# Patient Record
Sex: FEMALE | Race: BLACK OR AFRICAN AMERICAN | Employment: OTHER | URBAN - NONMETROPOLITAN AREA
[De-identification: names, ages, dates, MRNs, and addresses within clinical notes are randomized per-mention and may not be internally consistent; named-entity substitution may affect disease eponyms.]

---

## 2019-06-26 ENCOUNTER — HOSPITAL ENCOUNTER (EMERGENCY)
Age: 64
Discharge: HOME | End: 2019-06-26
Attending: EMERGENCY MEDICINE
Payer: MEDICARE

## 2019-06-26 VITALS
TEMPERATURE: 99.2 F | OXYGEN SATURATION: 97 % | BODY MASS INDEX: 45.78 KG/M2 | RESPIRATION RATE: 18 BRPM | HEIGHT: 62 IN | SYSTOLIC BLOOD PRESSURE: 135 MMHG | WEIGHT: 248.8 LBS | HEART RATE: 90 BPM | DIASTOLIC BLOOD PRESSURE: 72 MMHG

## 2019-06-26 DIAGNOSIS — H81.11 BPV (BENIGN POSITIONAL VERTIGO), RIGHT: Primary | ICD-10-CM

## 2019-06-26 DIAGNOSIS — M25.422 EFFUSION OF LEFT OLECRANON BURSA: ICD-10-CM

## 2019-06-26 PROCEDURE — 99283 EMERGENCY DEPT VISIT LOW MDM: CPT

## 2019-06-26 RX ORDER — SPIRONOLACTONE 25 MG/1
25 TABLET ORAL DAILY
COMMUNITY
Start: 2018-07-26

## 2019-06-26 RX ORDER — FUROSEMIDE 40 MG/1
40 TABLET ORAL DAILY
COMMUNITY
Start: 2018-02-01

## 2019-06-26 RX ORDER — EZETIMIBE 10 MG/1
10 TABLET ORAL DAILY
COMMUNITY
Start: 2018-07-26

## 2019-06-26 RX ORDER — MECLIZINE HYDROCHLORIDE 25 MG/1
12.5 TABLET ORAL 3 TIMES DAILY PRN
Qty: 30 TABLET | Refills: 0 | Status: SHIPPED | OUTPATIENT
Start: 2019-06-26 | End: 2019-07-06

## 2019-06-26 RX ORDER — COLCHICINE 0.6 MG/1
0.6 TABLET ORAL PRN
COMMUNITY
Start: 2018-07-26

## 2019-06-26 RX ORDER — FLUTICASONE PROPIONATE 50 MCG
1 SPRAY, SUSPENSION (ML) NASAL PRN
COMMUNITY
Start: 2018-07-26

## 2019-06-26 RX ORDER — ASPIRIN 81 MG/1
81 TABLET ORAL DAILY
COMMUNITY
Start: 2018-07-26

## 2019-06-26 RX ORDER — CLONAZEPAM 1 MG/1
1 TABLET ORAL PRN
COMMUNITY
Start: 2019-05-29 | End: 2019-06-28

## 2019-06-26 RX ORDER — MAGNESIUM OXIDE 400 MG/1
400 TABLET ORAL 2 TIMES DAILY
COMMUNITY
Start: 2018-07-26

## 2019-06-26 RX ORDER — CARVEDILOL 25 MG/1
25 TABLET ORAL 2 TIMES DAILY
COMMUNITY
Start: 2019-01-11

## 2019-06-26 ASSESSMENT — PAIN DESCRIPTION - PAIN TYPE: TYPE: ACUTE PAIN

## 2019-06-26 ASSESSMENT — PAIN DESCRIPTION - DESCRIPTORS: DESCRIPTORS: ACHING

## 2019-06-26 ASSESSMENT — PAIN DESCRIPTION - FREQUENCY: FREQUENCY: INTERMITTENT

## 2019-06-26 ASSESSMENT — PAIN SCALES - GENERAL: PAINLEVEL_OUTOF10: 3

## 2019-06-26 ASSESSMENT — PAIN DESCRIPTION - LOCATION: LOCATION: ELBOW

## 2019-06-26 ASSESSMENT — PAIN DESCRIPTION - ORIENTATION: ORIENTATION: LEFT

## 2019-06-26 NOTE — ED PROVIDER NOTES
eMERGENCY dEPARTMENT eNCOUnter      CHIEF COMPLAINT    No chief complaint on file. JOY Goodrich is a 62 y.o. female who presentsto ED from home  By private car  With complaint of right ear fullness, dizziness, left elbow swelling  Onset couple days  Intensity of symptoms moderate  Patient is visiting from [place] and feels that her right ear is full and clogged. She also has got intermittent dizziness which is vertiginous. Patient denies any other neurologic symptoms. Patient has got left elbow swelling which comes and goes. Patient denies any fever chills. Patient is a diabetic. PAST MEDICAL HISTORY    Past Medical History:   Diagnosis Date    CHF (congestive heart failure) ([address]     Diabetes mellitus [facility]     Gout     Hypertension     Pacemaker     Sleep apnea        SURGICAL HISTORY    Past Surgical History:   Procedure Laterality Date    PACEMAKER INSERTION Left 2012       CURRENT MEDICATIONS    Current Outpatient Rx   Medication Sig Dispense Refill    meclizine (ANTIVERT) 25 MG tablet Take 0.5 tablets by mouth 3 times daily as needed for Dizziness 30 tablet 0       ALLERGIES    Allergies   Allergen Reactions    Atorvastatin Itching    Codeine Itching    Hydralazine Rash       FAMILY HISTORY    No family history on file.     SOCIAL HISTORY    Social History     Socioeconomic History    Marital status: Unknown     Spouse name: Not on file    Number of children: Not on file    Years of education: Not on file    Highest education level: Not on file   Occupational History    Not on file   Social Needs    Financial resource strain: Not on file    Food insecurity:     Worry: Not on file     Inability: Not on file    Transportation needs:     Medical: Not on file     Non-medical: Not on file   Tobacco Use    Smoking status: Not on file   Substance and Sexual Activity    Alcohol use: Not on file    Drug use: Not on file    Sexual activity: Not on file   Lifestyle    Physical activity:     Days per week: Not on file     Minutes per session: Not on file    Stress: Not on file   Relationships    Social connections:     Talks on phone: Not on file     Gets together: Not on file     Attends Restoration service: Not on file     Active member of club or organization: Not on file     Attends meetings of clubs or organizations: Not on file     Relationship status: Not on file    Intimate partner violence:     Fear of current or ex partner: Not on file     Emotionally abused: Not on file     Physically abused: Not on file     Forced sexual activity: Not on file   Other Topics Concern    Not on file   Social History Narrative    Not on file       REVIEW OF SYSTEMS    Constitutional:  Denies fever, chills, weight loss or weakness   Eyes:  Denies photophobia or discharge   HENT:  Denies sore throat or ear pain   Respiratory:  Denies cough or shortness of breath   Cardiovascular:  Denies chest pain, palpitations or swelling   GI:  Denies abdominal pain, nausea, vomiting, or diarrhea   Musculoskeletal:  Denies back pain   Skin:  Denies rash   Neurologic:  Denies headache, focal weakness or sensory changes   Endocrine:  Denies polyuria or polydypsia   Lymphatic:  Denies swollen glands   Psychiatric:  Denies depression, suicidal ideation or homicidal ideation   All systems negative except as marked. PHYSICAL EXAM    VITAL SIGNS: Pulse 90   Temp 99.2 °F (37.3 °C) (Oral)   Resp 18   Ht 5' 2\" (1.575 m)   Wt 248 lb 12.8 oz (112.9 kg)   SpO2 97%   BMI 45.51 kg/m²    Constitutional:  Well developed, Well nourished, No acute distress, Non-toxic appearance. HENT:  Normocephalic, Atraumatic, Bilateral external ears normal, Oropharynx moist, No oral exudates, Nose normal. Neck- Normal range of motion, No tenderness, Supple, No stridor. Eyes:  PERRL, EOMI, Conjunctiva normal, No discharge. Respiratory:  Normal breath sounds, No respiratory distress, No wheezing, No chest tenderness. Cardiovascular:  Normal heart rate, Normal rhythm, No murmurs, No rubs, No gallops. GI:  Bowel sounds normal, Soft, No tenderness, No masses, No pulsatile masses. : No CVA tenderness. Musculoskeletal: Left elbow olecranon bursa swelling present no evidence of cellulitis or abscess. Intact distal pulses, No edema, No tenderness, No cyanosis, No clubbing. Good range of motion in all major joints. No tenderness to palpation or major deformities noted. Back- No tenderness. Integument:  Warm, Dry, No erythema, No rash. Lymphatic:  No lymphadenopathy noted. Neurologic:  Alert & oriented x 3, Normal motor function, Normal sensory function, No focal deficits noted. Cerebellar signs absent  Psychiatric:  Affect normal, Judgment normal, Mood normal.         REEVALUATION   Patient ambulating. Summation      Patient Course:     ED Medications administered this visit:  Medications - No data to display    New Prescriptions from this visit:    New Prescriptions    MECLIZINE (ANTIVERT) 25 MG TABLET    Take 0.5 tablets by mouth 3 times daily as needed for Dizziness       Follow-up:  Your PCP    Call in 1 day          Final Impression:   1. BPV (benign positional vertigo), right    2.  Effusion of left olecranon bursa               (Please note that portions of this note were completed with a voice recognition program.  Efforts were made to edit the dictations but occasionally words are mis-transcribed.)            Anna Ruvalcaba MD  06/26/19 9153

## 2019-09-18 ENCOUNTER — HOSPITAL ENCOUNTER (EMERGENCY)
Age: 64
Discharge: HOME | End: 2019-09-18
Attending: EMERGENCY MEDICINE
Payer: MEDICARE

## 2019-09-18 ENCOUNTER — APPOINTMENT (OUTPATIENT)
Dept: GENERAL RADIOLOGY | Age: 64
End: 2019-09-18
Payer: MEDICARE

## 2019-09-18 VITALS
SYSTOLIC BLOOD PRESSURE: 175 MMHG | DIASTOLIC BLOOD PRESSURE: 80 MMHG | RESPIRATION RATE: 12 BRPM | WEIGHT: 245.3 LBS | HEART RATE: 92 BPM | OXYGEN SATURATION: 99 % | HEIGHT: 62 IN | TEMPERATURE: 98.2 F | BODY MASS INDEX: 45.14 KG/M2

## 2019-09-18 DIAGNOSIS — K29.00 ACUTE SUPERFICIAL GASTRITIS WITHOUT HEMORRHAGE: ICD-10-CM

## 2019-09-18 DIAGNOSIS — R42 DIZZINESS: Primary | ICD-10-CM

## 2019-09-18 LAB
-: ABNORMAL
ABSOLUTE EOS #: 0.2 K/UL (ref 0–0.4)
ABSOLUTE IMMATURE GRANULOCYTE: ABNORMAL K/UL (ref 0–0.3)
ABSOLUTE LYMPH #: 1.9 K/UL (ref 1–4.8)
ABSOLUTE MONO #: 0.7 K/UL (ref 0–1)
ALBUMIN SERPL-MCNC: 3.9 G/DL (ref 3.5–5.2)
ALBUMIN/GLOBULIN RATIO: ABNORMAL (ref 1–2.5)
ALP BLD-CCNC: 86 U/L (ref 35–104)
ALT SERPL-CCNC: 18 U/L (ref 5–33)
AMORPHOUS: ABNORMAL
ANION GAP SERPL CALCULATED.3IONS-SCNC: 12 MMOL/L (ref 9–17)
AST SERPL-CCNC: 16 U/L
BACTERIA: ABNORMAL
BASOPHILS # BLD: 1 % (ref 0–2)
BASOPHILS ABSOLUTE: 0 K/UL (ref 0–0.2)
BILIRUB SERPL-MCNC: 0.17 MG/DL (ref 0.3–1.2)
BILIRUBIN URINE: NEGATIVE
BNP INTERPRETATION: NORMAL
BUN BLDV-MCNC: 23 MG/DL (ref 8–23)
BUN/CREAT BLD: 24 (ref 9–20)
CALCIUM SERPL-MCNC: 9.9 MG/DL (ref 8.6–10.4)
CASTS UA: ABNORMAL /LPF
CHLORIDE BLD-SCNC: 102 MMOL/L (ref 98–107)
CO2: 27 MMOL/L (ref 20–31)
COLOR: YELLOW
COMMENT UA: ABNORMAL
CREAT SERPL-MCNC: 0.95 MG/DL (ref 0.5–0.9)
CRYSTALS, UA: ABNORMAL /HPF
DIFFERENTIAL TYPE: YES
EOSINOPHILS RELATIVE PERCENT: 3 % (ref 0–5)
EPITHELIAL CELLS UA: ABNORMAL /HPF
GFR AFRICAN AMERICAN: >60 ML/MIN
GFR NON-AFRICAN AMERICAN: 59 ML/MIN
GFR SERPL CREATININE-BSD FRML MDRD: ABNORMAL ML/MIN/{1.73_M2}
GFR SERPL CREATININE-BSD FRML MDRD: ABNORMAL ML/MIN/{1.73_M2}
GLUCOSE BLD-MCNC: 183 MG/DL (ref 70–99)
GLUCOSE URINE: NEGATIVE
HCT VFR BLD CALC: 38.4 % (ref 36–46)
HEMOGLOBIN: 12.6 G/DL (ref 12–16)
IMMATURE GRANULOCYTES: ABNORMAL %
KETONES, URINE: NEGATIVE
LEUKOCYTE ESTERASE, URINE: ABNORMAL
LIPASE: 13 U/L (ref 13–60)
LYMPHOCYTES # BLD: 29 % (ref 15–40)
MCH RBC QN AUTO: 30.3 PG (ref 26–34)
MCHC RBC AUTO-ENTMCNC: 32.8 G/DL (ref 31–37)
MCV RBC AUTO: 92.3 FL (ref 80–100)
MONOCYTES # BLD: 10 % (ref 4–8)
MUCUS: ABNORMAL
NITRITE, URINE: NEGATIVE
NRBC AUTOMATED: ABNORMAL PER 100 WBC
OTHER OBSERVATIONS UA: ABNORMAL
PDW BLD-RTO: 13.9 % (ref 12.1–15.2)
PH UA: 5 (ref 5–8)
PLATELET # BLD: 232 K/UL (ref 140–450)
PLATELET ESTIMATE: ABNORMAL
PMV BLD AUTO: ABNORMAL FL (ref 6–12)
POTASSIUM SERPL-SCNC: 4.5 MMOL/L (ref 3.7–5.3)
PRO-BNP: 81 PG/ML
PROTEIN UA: ABNORMAL
RBC # BLD: 4.16 M/UL (ref 4–5.2)
RBC # BLD: ABNORMAL 10*6/UL
RBC UA: ABNORMAL /HPF (ref 0–2)
RENAL EPITHELIAL, UA: ABNORMAL /HPF
SEG NEUTROPHILS: 57 % (ref 47–75)
SEGMENTED NEUTROPHILS ABSOLUTE COUNT: 3.8 K/UL (ref 2.5–7)
SODIUM BLD-SCNC: 141 MMOL/L (ref 135–144)
SPECIFIC GRAVITY UA: 1.01 (ref 1–1.03)
TOTAL PROTEIN: 6.6 G/DL (ref 6.4–8.3)
TRICHOMONAS: ABNORMAL
TROPONIN INTERP: NORMAL
TROPONIN T: <0.03 NG/ML
TROPONIN, HIGH SENSITIVITY: NORMAL NG/L (ref 0–14)
TURBIDITY: CLEAR
URINE HGB: ABNORMAL
UROBILINOGEN, URINE: NORMAL
WBC # BLD: 6.6 K/UL (ref 3.5–11)
WBC # BLD: ABNORMAL 10*3/UL
WBC UA: ABNORMAL /HPF
YEAST: ABNORMAL

## 2019-09-18 PROCEDURE — 6370000000 HC RX 637 (ALT 250 FOR IP): Performed by: EMERGENCY MEDICINE

## 2019-09-18 PROCEDURE — 83880 ASSAY OF NATRIURETIC PEPTIDE: CPT

## 2019-09-18 PROCEDURE — 99284 EMERGENCY DEPT VISIT MOD MDM: CPT

## 2019-09-18 PROCEDURE — 71045 X-RAY EXAM CHEST 1 VIEW: CPT

## 2019-09-18 PROCEDURE — 85025 COMPLETE CBC W/AUTO DIFF WBC: CPT

## 2019-09-18 PROCEDURE — 93005 ELECTROCARDIOGRAM TRACING: CPT | Performed by: EMERGENCY MEDICINE

## 2019-09-18 PROCEDURE — 84484 ASSAY OF TROPONIN QUANT: CPT

## 2019-09-18 PROCEDURE — 36415 COLL VENOUS BLD VENIPUNCTURE: CPT

## 2019-09-18 PROCEDURE — 81001 URINALYSIS AUTO W/SCOPE: CPT

## 2019-09-18 PROCEDURE — 80053 COMPREHEN METABOLIC PANEL: CPT

## 2019-09-18 PROCEDURE — 83690 ASSAY OF LIPASE: CPT

## 2019-09-18 RX ORDER — MECLIZINE HYDROCHLORIDE 25 MG/1
25 TABLET ORAL 3 TIMES DAILY PRN
Qty: 15 TABLET | Refills: 0 | Status: SHIPPED | OUTPATIENT
Start: 2019-09-18 | End: 2019-09-28

## 2019-09-18 RX ORDER — CLONAZEPAM 1 MG/1
1 TABLET ORAL 2 TIMES DAILY PRN
COMMUNITY

## 2019-09-18 RX ADMIN — LIDOCAINE HYDROCHLORIDE: 20 SOLUTION ORAL; TOPICAL at 19:47

## 2019-09-20 LAB
EKG ATRIAL RATE: 92 BPM
EKG P AXIS: 70 DEGREES
EKG P-R INTERVAL: 136 MS
EKG Q-T INTERVAL: 372 MS
EKG QRS DURATION: 100 MS
EKG QTC CALCULATION (BAZETT): 460 MS
EKG R AXIS: 37 DEGREES
EKG T AXIS: 56 DEGREES
EKG VENTRICULAR RATE: 92 BPM

## 2019-09-20 PROCEDURE — 93010 ELECTROCARDIOGRAM REPORT: CPT | Performed by: INTERNAL MEDICINE

## 2021-01-03 ENCOUNTER — HOSPITAL ENCOUNTER (EMERGENCY)
Age: 66
Discharge: HOME | End: 2021-01-03
Attending: FAMILY MEDICINE
Payer: MEDICARE

## 2021-01-03 VITALS
BODY MASS INDEX: 45.78 KG/M2 | OXYGEN SATURATION: 98 % | SYSTOLIC BLOOD PRESSURE: 162 MMHG | WEIGHT: 250.3 LBS | HEART RATE: 101 BPM | TEMPERATURE: 99.7 F | RESPIRATION RATE: 16 BRPM | DIASTOLIC BLOOD PRESSURE: 86 MMHG

## 2021-01-03 DIAGNOSIS — M10.041 ACUTE IDIOPATHIC GOUT OF RIGHT HAND: Primary | ICD-10-CM

## 2021-01-03 PROCEDURE — 99283 EMERGENCY DEPT VISIT LOW MDM: CPT

## 2021-01-03 PROCEDURE — 6370000000 HC RX 637 (ALT 250 FOR IP): Performed by: FAMILY MEDICINE

## 2021-01-03 RX ORDER — DULOXETIN HYDROCHLORIDE 30 MG/1
30 CAPSULE, DELAYED RELEASE ORAL DAILY
COMMUNITY

## 2021-01-03 RX ORDER — NAPROXEN 250 MG/1
500 TABLET ORAL ONCE
Status: COMPLETED | OUTPATIENT
Start: 2021-01-03 | End: 2021-01-03

## 2021-01-03 RX ORDER — COLCHICINE 0.6 MG/1
0.6 TABLET ORAL DAILY
Qty: 30 TABLET | Refills: 0 | Status: SHIPPED | OUTPATIENT
Start: 2021-01-03

## 2021-01-03 RX ADMIN — NAPROXEN 500 MG: 250 TABLET ORAL at 06:56

## 2021-01-03 ASSESSMENT — PAIN DESCRIPTION - PAIN TYPE: TYPE: ACUTE PAIN

## 2021-01-03 ASSESSMENT — PAIN DESCRIPTION - LOCATION: LOCATION: HAND

## 2021-01-03 ASSESSMENT — PAIN DESCRIPTION - ORIENTATION: ORIENTATION: RIGHT

## 2021-01-03 ASSESSMENT — PAIN DESCRIPTION - DESCRIPTORS: DESCRIPTORS: CONSTANT;PRESSURE

## 2021-01-03 ASSESSMENT — PAIN SCALES - GENERAL
PAINLEVEL_OUTOF10: 8
PAINLEVEL_OUTOF10: 8

## 2021-01-03 ASSESSMENT — PAIN DESCRIPTION - FREQUENCY: FREQUENCY: CONTINUOUS

## 2021-01-03 NOTE — ED PROVIDER NOTES
 Insulin NPH Isophane & Regular (HUMULIN 70/30 KWIKPEN) (70-30) 100 UNIT per ML injection pen Inject 32 Units into the skin every morning 17 units at bedtime      magnesium oxide (MAG-OX) 400 MG tablet Take 400 mg by mouth 2 times daily      metFORMIN (GLUCOPHAGE) 850 MG tablet Take 850 mg by mouth 2 times daily      sacubitril-valsartan (ENTRESTO)  MG per tablet Take 1 tablet by mouth 2 times daily      spironolactone (ALDACTONE) 25 MG tablet Take 25 mg by mouth daily         ALLERGIES    Allergies   Allergen Reactions    Atorvastatin Itching    Codeine Itching    Hydralazine Rash       FAMILY HISTORY    No family history on file.     SOCIAL HISTORY    Social History     Socioeconomic History    Marital status: Single     Spouse name: Not on file    Number of children: Not on file    Years of education: Not on file    Highest education level: Not on file   Occupational History    Not on file   Social Needs    Financial resource strain: Not on file    Food insecurity     Worry: Not on file     Inability: Not on file    Transportation needs     Medical: Not on file     Non-medical: Not on file   Tobacco Use    Smoking status: Former Smoker    Smokeless tobacco: Never Used   Substance and Sexual Activity    Alcohol use: Not on file    Drug use: Not on file    Sexual activity: Not on file   Lifestyle    Physical activity     Days per week: Not on file     Minutes per session: Not on file    Stress: Not on file   Relationships    Social connections     Talks on phone: Not on file     Gets together: Not on file     Attends Latter-day service: Not on file     Active member of club or organization: Not on file     Attends meetings of clubs or organizations: Not on file     Relationship status: Not on file    Intimate partner violence     Fear of current or ex partner: Not on file     Emotionally abused: Not on file     Physically abused: Not on file     Forced sexual activity: Not on file Other Topics Concern    Not on file   Social History Narrative    Not on file       PHYSICAL EXAM    VITAL SIGNS: BP (!) 162/86   Pulse 101   Temp 99.7 °F (37.6 °C) (Oral)   Resp 16   Wt 250 lb 4.8 oz (113.5 kg)   SpO2 98%   BMI 45.78 kg/m²   Constitutional:  Well developed, well nourished, 61-year-old black female with right hand pain and swelling especially on the dorsal aspect down to the wrist.  No acute distress. HENT:  Atraumatic, external ears normal, nose normal, oropharynx moist.  Neck- normal range of motion, no tenderness, supple. Trachea is midline. No thyroid enlargement or tenderness  Respiratory:  No respiratory distress, normal breath sounds. Cardiovascular:  Normal rate, normal rhythm, no murmurs, no gallops, no rubs   GI:  Soft, obese, nontender   Musculoskeletal: Right hand pain and swelling. Dorsal aspect of the metatarsal area into the wrist is swollen. Palmar aspect not swollen. The fingers have good color and capillary refill with no deformity. Nails are intact. The right elbow and shoulder have no pain or restricted motion. The knees and feet show no gouty involvement. Spine is grossly aligned and nontender. Integument:  Well hydrated, no rash of the upper extremities, neck or face. Neurologic: Alert and oriented female. Her speech is fluent and mentation appropriate with this discussion. She shows controlled and coordinated movements of her extremities. No significant hemiparesis. Her gait is stable. ED COURSE & MEDICAL DECISION MAKING    Pertinent Labs & Imaging studies reviewed. (See chart for details)    Summation      Patient Course: 61-year-old black female with right hand pain and swelling which appear to be secondary to acute gout flareup. No apparent trauma. Treated with naproxen and prescription for colchicine. She has taken this medicine before with good results. Patient is stable for discharge and will follow with PCP in [facility]. ED Medications administered this visit:    Medications   naproxen (NAPROSYN) tablet 500 mg (500 mg Oral Given 1/3/21 0656)       New Prescriptions from this visit:    New Prescriptions    COLCHICINE (COLCRYS) 0.6 MG TABLET    Take 1 tablet by mouth daily       Follow-up:  MD Curt Oliva  [phone]    Call in 2 days  If symptoms worsen        Final Impression:   1.  Acute idiopathic gout of right hand               (Please note that portions of this note were completed with a voice recognition program.  Efforts were made to edit the dictations but occasionally words are mis-transcribed.)          Eleanor Goldman DO  01/05/21 8710

## 2021-01-03 NOTE — ED NOTES
[facility] called to see if we could change the Colchicine to something else d/t the Colchicine medications needs to have a prior authorization. Dr. Tawny Brizuela does not want to change the medication. New order received for Prednisone 40mg by mouth for seven days.       Jenna Neves RN  01/03/21 1557

## 2023-11-06 PROBLEM — R94.30 CARDIAC LV EJECTION FRACTION >40%: Status: ACTIVE | Noted: 2023-11-06

## 2023-11-06 PROBLEM — I10 HYPERTENSION: Status: ACTIVE | Noted: 2023-11-06

## 2023-11-06 PROBLEM — I50.9 CHF (CONGESTIVE HEART FAILURE) (MULTI): Status: ACTIVE | Noted: 2023-11-06

## 2023-11-06 PROBLEM — Z95.810 ICD (IMPLANTABLE CARDIOVERTER-DEFIBRILLATOR) IN PLACE: Status: ACTIVE | Noted: 2023-11-06

## 2023-11-06 PROBLEM — E11.9 DIABETES MELLITUS (MULTI): Status: ACTIVE | Noted: 2023-11-06

## 2023-11-06 PROBLEM — F41.9 ANXIETY: Status: ACTIVE | Noted: 2023-11-06

## 2023-11-06 PROBLEM — E78.5 HYPERLIPIDEMIA: Status: ACTIVE | Noted: 2023-11-06

## 2023-11-06 PROBLEM — I47.20 VENTRICULAR TACHYCARDIA (MULTI): Status: ACTIVE | Noted: 2023-11-06

## 2023-11-06 RX ORDER — CARVEDILOL 25 MG/1
1 TABLET ORAL 2 TIMES DAILY
COMMUNITY
Start: 2018-02-13

## 2023-11-06 RX ORDER — INSULIN HUMAN 100 [IU]/ML
INJECTION, SUSPENSION SUBCUTANEOUS
COMMUNITY
Start: 2018-09-26

## 2023-11-06 RX ORDER — SPIRONOLACTONE 25 MG/1
1 TABLET ORAL DAILY
COMMUNITY
Start: 2018-02-13 | End: 2023-11-07 | Stop reason: ALTCHOICE

## 2023-11-06 RX ORDER — EZETIMIBE 10 MG/1
1 TABLET ORAL DAILY
COMMUNITY
Start: 2018-02-13

## 2023-11-06 RX ORDER — FUROSEMIDE 40 MG/1
0.5 TABLET ORAL DAILY
COMMUNITY
Start: 2018-02-13 | End: 2024-04-17 | Stop reason: SDUPTHER

## 2023-11-06 RX ORDER — CHOLECALCIFEROL (VITAMIN D3) 25 MCG
1 TABLET ORAL DAILY
COMMUNITY
End: 2023-11-07 | Stop reason: ALTCHOICE

## 2023-11-06 RX ORDER — SACUBITRIL AND VALSARTAN 97; 103 MG/1; MG/1
1 TABLET, FILM COATED ORAL 2 TIMES DAILY
COMMUNITY
Start: 2018-02-13 | End: 2023-11-07 | Stop reason: SDUPTHER

## 2023-11-06 RX ORDER — ASPIRIN 81 MG/1
1 TABLET ORAL DAILY
COMMUNITY
Start: 2018-07-27

## 2023-11-06 RX ORDER — CLONAZEPAM 1 MG/1
1 TABLET ORAL 3 TIMES DAILY PRN
COMMUNITY
Start: 2018-02-13

## 2023-11-06 RX ORDER — METFORMIN HYDROCHLORIDE 850 MG/1
TABLET ORAL 2 TIMES DAILY
COMMUNITY
End: 2023-11-07 | Stop reason: ALTCHOICE

## 2023-11-06 RX ORDER — VIT C/E/ZN/COPPR/LUTEIN/ZEAXAN 250MG-90MG
1 CAPSULE ORAL DAILY
COMMUNITY
Start: 2018-09-04

## 2023-11-06 RX ORDER — LIRAGLUTIDE 6 MG/ML
INJECTION SUBCUTANEOUS
COMMUNITY
Start: 2021-06-02

## 2023-11-06 RX ORDER — CALCIUM CARBONATE 300MG(750)
1 TABLET,CHEWABLE ORAL DAILY
COMMUNITY
Start: 2018-02-13

## 2023-11-07 ENCOUNTER — HOSPITAL ENCOUNTER (OUTPATIENT)
Dept: CARDIOLOGY | Facility: CLINIC | Age: 68
Discharge: HOME | End: 2023-11-07
Payer: MEDICAID

## 2023-11-07 ENCOUNTER — APPOINTMENT (OUTPATIENT)
Dept: LAB | Facility: LAB | Age: 68
End: 2023-11-07
Payer: MEDICAID

## 2023-11-07 ENCOUNTER — OFFICE VISIT (OUTPATIENT)
Dept: CARDIOLOGY | Facility: CLINIC | Age: 68
End: 2023-11-07
Payer: MEDICAID

## 2023-11-07 VITALS
HEART RATE: 80 BPM | SYSTOLIC BLOOD PRESSURE: 124 MMHG | WEIGHT: 234.5 LBS | DIASTOLIC BLOOD PRESSURE: 78 MMHG | RESPIRATION RATE: 18 BRPM | BODY MASS INDEX: 43.15 KG/M2 | HEIGHT: 62 IN

## 2023-11-07 DIAGNOSIS — I10 HYPERTENSION, UNSPECIFIED TYPE: ICD-10-CM

## 2023-11-07 DIAGNOSIS — I50.9 CONGESTIVE HEART FAILURE, UNSPECIFIED HF CHRONICITY, UNSPECIFIED HEART FAILURE TYPE (MULTI): ICD-10-CM

## 2023-11-07 DIAGNOSIS — I42.0 CONGESTIVE CARDIOMYOPATHY (MULTI): ICD-10-CM

## 2023-11-07 DIAGNOSIS — I50.9 CHF (CONGESTIVE HEART FAILURE) (MULTI): Primary | ICD-10-CM

## 2023-11-07 DIAGNOSIS — R94.30 CARDIAC LV EJECTION FRACTION >40%: ICD-10-CM

## 2023-11-07 DIAGNOSIS — Z45.02 IMPLANTABLE DEFIBRILLATOR REPROGRAMMING/CHECK: ICD-10-CM

## 2023-11-07 DIAGNOSIS — I48.91 ATRIAL FIBRILLATION, UNSPECIFIED TYPE (MULTI): ICD-10-CM

## 2023-11-07 DIAGNOSIS — E11.69 TYPE 2 DIABETES MELLITUS WITH OTHER SPECIFIED COMPLICATION, UNSPECIFIED WHETHER LONG TERM INSULIN USE (MULTI): ICD-10-CM

## 2023-11-07 PROCEDURE — 99214 OFFICE O/P EST MOD 30 MIN: CPT | Performed by: INTERNAL MEDICINE

## 2023-11-07 PROCEDURE — 93306 TTE W/DOPPLER COMPLETE: CPT

## 2023-11-07 PROCEDURE — 93296 REM INTERROG EVL PM/IDS: CPT

## 2023-11-07 PROCEDURE — 1159F MED LIST DOCD IN RCRD: CPT | Performed by: INTERNAL MEDICINE

## 2023-11-07 PROCEDURE — 1126F AMNT PAIN NOTED NONE PRSNT: CPT | Performed by: INTERNAL MEDICINE

## 2023-11-07 PROCEDURE — 3074F SYST BP LT 130 MM HG: CPT | Performed by: INTERNAL MEDICINE

## 2023-11-07 PROCEDURE — 99214 OFFICE O/P EST MOD 30 MIN: CPT | Mod: PO | Performed by: INTERNAL MEDICINE

## 2023-11-07 PROCEDURE — 3078F DIAST BP <80 MM HG: CPT | Performed by: INTERNAL MEDICINE

## 2023-11-07 PROCEDURE — 93306 TTE W/DOPPLER COMPLETE: CPT | Performed by: INTERNAL MEDICINE

## 2023-11-07 RX ORDER — SACUBITRIL AND VALSARTAN 97; 103 MG/1; MG/1
1 TABLET, FILM COATED ORAL 2 TIMES DAILY
Qty: 180 TABLET | Refills: 3 | Status: SHIPPED | OUTPATIENT
Start: 2023-11-07 | End: 2023-11-16 | Stop reason: SDUPTHER

## 2023-11-07 ASSESSMENT — ENCOUNTER SYMPTOMS
DEPRESSION: 0
LOSS OF SENSATION IN FEET: 0
OCCASIONAL FEELINGS OF UNSTEADINESS: 0

## 2023-11-07 NOTE — PROGRESS NOTES
Subjective   Tsering Hoskins is a 68 y.o. female.    Chief Complaint:    TSERING HOSKINS is being seen for a six month  follow-up of dyslipidemia, heart failure,  hypertension, ventricular tachycardia, ICD and a routine medication evaluation.     HPI  This is a 69 y/o female today for a six month Cardiology follow up visit. Today she denies chest pain, heart palpitations, or pedal edema. Continues to get sob with exertion. An echocardiogram was done earlier today- see report.     Review of Systems   All other systems reviewed and are negative.      Objective   Physical Exam  This is a 69 y/o female here today for a six month Cardiology follow up visit  Cardiovascular Rate and Rhythm regular with S1 and S2 without murmur rub or click   Pulmonary lungs bilaterally clear to auscultation posterior   Neck supple no JVP no bruit good carotid upstroke   Abdomen soft non-tender bowel sounds present in all four quadrants   Extremities no pedal edema pedal pulse present bilaterally     Lab Review:   No visits with results within 6 Month(s) from this visit.   Latest known visit with results is:   Legacy Encounter on 01/28/2020   Component Date Value    Ventricular Rate 01/28/2020 78     Atrial Rate 01/28/2020 78     TN Interval 01/28/2020 118     QRS Duration 01/28/2020 102     QT Interval 01/28/2020 398     QTC Calculation(Bazett) 01/28/2020 453     P Axis 01/28/2020 73     R Axis 01/28/2020 59     T [place] 01/28/2020 10     QRS Count 01/28/2020 12     Q Onset 01/28/2020 217     P Onset 01/28/2020 158     P Offset 01/28/2020 197     T Offset 01/28/2020 416     QTC Fredericia 01/28/2020 434     Glucose 01/28/2020 196 (H)     Sodium 01/28/2020 139     Potassium 01/28/2020 3.9     Chloride 01/28/2020 105     Bicarbonate 01/28/2020 22     Anion Gap 01/28/2020 16     Urea Nitrogen 01/28/2020 26 (H)     Creatinine 01/28/2020 0.91     GLOMERULAR FILTRATION RA* 01/28/2020 >60     GLOMERULAR FILTRATION RA* 01/28/2020 >60     Calcium  01/28/2020 8.7     Protime 01/28/2020 11.0     INR 01/28/2020 1.0     Troponin I 01/28/2020 <0.02     WBC 01/28/2020 6.3     RBC 01/28/2020 4.22     Hemoglobin 01/28/2020 12.7     Hematocrit 01/28/2020 40.2     MCV 01/28/2020 95     MCHC 01/28/2020 31.6 (L)     Platelets 01/28/2020 243     RDW 01/28/2020 12.6     Neutrophils % 01/28/2020 49.9     Immature Granulocytes %,* 01/28/2020 0.5     Lymphocytes % 01/28/2020 32.9     Monocytes % 01/28/2020 11.6     Eosinophils % 01/28/2020 4.3     Basophils % 01/28/2020 0.8     Neutrophils Absolute 01/28/2020 3.15     Lymphocytes Absolute 01/28/2020 2.07     Monocytes Absolute 01/28/2020 0.73     Eosinophils Absolute 01/28/2020 0.27     Basophils Absolute 01/28/2020 0.05        Assessment/Plan   1. NICM/CHF. Records from [facility] show cardiomyopathy since 1996, with EF 35-45%. Echo done 01/24/2018 at [facility] showed EF 35%, LVH. ICD was followed at [facility]. Placed 04/17/2012 by Dr Ovidio Reyna. Has established with Dr Hernández. The patient did have a repeat surveillance echocardiogram on 05/21/2019 demonstrating an LV ejection fraction at 25â€“30 percent with moderate to severe left atrial enlargement and moderate to severe left ventricular chamber dilatation. Lab work from 08/12/2019 included a normal CBC and SMA panel except glucose 153. Renal parameters were normal creatinine of 0.77. Currently taking carvedilol 25 mg twice daily and entresto  twice daily as well as furosemide 40 mg daily and spironolactone 25 mg daily.      2. Status post ICD implantation for 17 2012 [facility] with pulse generator replacement 08/21/2019 [facility]. The patient has a single chamber device.     3. HTN. BP is adequately controlled today and will continue on the present dosages of carvedilol and the entresto plus spironolactone.     4. Hyperlipidemia. Patient had a direct LDL of 140 when checked on in 2021 at [facility]. Can't tolerate statins. On Zetia 10 mg daily.     5. DM. On insulin.  Last A1C was 8.0% when checked in 2021 at [facility].      6. ELVA. The patient is having some trouble with her C Pap mask.     7. DJD. Follows with pain management for injections.      8. Hx of covid-19 vaccines.

## 2023-11-07 NOTE — PATIENT INSTRUCTIONS
Follow up in six months  Device clinic appointment  Obtain Lab work this month  Continue current medications

## 2023-11-08 LAB
AORTIC VALVE PEAK VELOCITY: 1.35
AV PEAK GRADIENT: 7.2
AVA (PEAK VEL): 2.72
EJECTION FRACTION APICAL 4 CHAMBER: 42.2
EJECTION FRACTION: 37
LEFT ATRIUM VOLUME AREA LENGTH INDEX BSA: 21.3
LEFT VENTRICLE INTERNAL DIMENSION DIASTOLE: 6.14 (ref 3.5–6)
LEFT VENTRICULAR OUTFLOW TRACT DIAMETER: 1.96
MITRAL VALVE E/A RATIO: 0.5
RIGHT VENTRICLE FREE WALL PEAK S': 13
RIGHT VENTRICLE PEAK SYSTOLIC PRESSURE: 25.4
TRICUSPID ANNULAR PLANE SYSTOLIC EXCURSION: 1.9

## 2023-11-10 PROCEDURE — 93295 DEV INTERROG REMOTE 1/2/MLT: CPT | Performed by: INTERNAL MEDICINE

## 2023-11-16 DIAGNOSIS — I50.9 CHF (CONGESTIVE HEART FAILURE) (MULTI): ICD-10-CM

## 2023-11-16 RX ORDER — SACUBITRIL AND VALSARTAN 97; 103 MG/1; MG/1
1 TABLET, FILM COATED ORAL 2 TIMES DAILY
Qty: 180 TABLET | Refills: 3 | Status: SHIPPED | OUTPATIENT
Start: 2023-11-16 | End: 2024-11-15

## 2023-11-17 ENCOUNTER — SPECIALTY PHARMACY (OUTPATIENT)
Dept: PHARMACY | Facility: CLINIC | Age: 68
End: 2023-11-17

## 2023-12-01 ENCOUNTER — SPECIALTY PHARMACY (OUTPATIENT)
Dept: PHARMACY | Facility: CLINIC | Age: 68
End: 2023-12-01

## 2023-12-07 ENCOUNTER — HOSPITAL ENCOUNTER (OUTPATIENT)
Dept: CARDIOLOGY | Facility: CLINIC | Age: 68
Discharge: HOME | End: 2023-12-07
Payer: MEDICAID

## 2023-12-07 ENCOUNTER — LAB (OUTPATIENT)
Dept: LAB | Facility: LAB | Age: 68
End: 2023-12-07
Payer: MEDICAID

## 2023-12-07 DIAGNOSIS — Z45.02 IMPLANTABLE DEFIBRILLATOR REPROGRAMMING/CHECK: Primary | ICD-10-CM

## 2023-12-07 DIAGNOSIS — E11.69 TYPE 2 DIABETES MELLITUS WITH OTHER SPECIFIED COMPLICATION, UNSPECIFIED WHETHER LONG TERM INSULIN USE (MULTI): ICD-10-CM

## 2023-12-07 DIAGNOSIS — Z45.02 IMPLANTABLE DEFIBRILLATOR REPROGRAMMING/CHECK: ICD-10-CM

## 2023-12-07 DIAGNOSIS — I42.0 CONGESTIVE CARDIOMYOPATHY (MULTI): ICD-10-CM

## 2023-12-07 DIAGNOSIS — I48.91 ATRIAL FIBRILLATION, UNSPECIFIED TYPE (MULTI): ICD-10-CM

## 2023-12-07 LAB
ANION GAP SERPL CALC-SCNC: 12 MMOL/L (ref 10–20)
BUN SERPL-MCNC: 19 MG/DL (ref 6–23)
CALCIUM SERPL-MCNC: 9.3 MG/DL (ref 8.6–10.6)
CHLORIDE SERPL-SCNC: 106 MMOL/L (ref 98–107)
CO2 SERPL-SCNC: 30 MMOL/L (ref 21–32)
CREAT SERPL-MCNC: 0.89 MG/DL (ref 0.5–1.05)
EST. AVERAGE GLUCOSE BLD GHB EST-MCNC: 123 MG/DL
GFR SERPL CREATININE-BSD FRML MDRD: 71 ML/MIN/1.73M*2
GLUCOSE SERPL-MCNC: 105 MG/DL (ref 74–99)
HBA1C MFR BLD: 5.9 %
POTASSIUM SERPL-SCNC: 4.4 MMOL/L (ref 3.5–5.3)
SODIUM SERPL-SCNC: 144 MMOL/L (ref 136–145)

## 2023-12-07 PROCEDURE — 83036 HEMOGLOBIN GLYCOSYLATED A1C: CPT

## 2023-12-07 PROCEDURE — 80048 BASIC METABOLIC PNL TOTAL CA: CPT

## 2023-12-07 PROCEDURE — 36415 COLL VENOUS BLD VENIPUNCTURE: CPT

## 2023-12-11 PROCEDURE — RXMED WILLOW AMBULATORY MEDICATION CHARGE

## 2023-12-14 ENCOUNTER — SPECIALTY PHARMACY (OUTPATIENT)
Dept: PHARMACY | Facility: CLINIC | Age: 68
End: 2023-12-14

## 2023-12-15 ENCOUNTER — PHARMACY VISIT (OUTPATIENT)
Dept: PHARMACY | Facility: CLINIC | Age: 68
End: 2023-12-15
Payer: MEDICAID

## 2024-01-17 DIAGNOSIS — I50.9 CHF (CONGESTIVE HEART FAILURE) (MULTI): Primary | ICD-10-CM

## 2024-01-17 DIAGNOSIS — E11.9 DIABETES MELLITUS (MULTI): ICD-10-CM

## 2024-01-18 RX ORDER — EMPAGLIFLOZIN 10 MG/1
10 TABLET, FILM COATED ORAL DAILY
Qty: 30 TABLET | Refills: 10 | Status: SHIPPED | OUTPATIENT
Start: 2024-01-18 | End: 2024-01-24 | Stop reason: SDUPTHER

## 2024-01-24 DIAGNOSIS — E11.9 DIABETES MELLITUS (MULTI): ICD-10-CM

## 2024-01-24 DIAGNOSIS — I50.9 CHF (CONGESTIVE HEART FAILURE) (MULTI): ICD-10-CM

## 2024-03-01 ENCOUNTER — SPECIALTY PHARMACY (OUTPATIENT)
Dept: PHARMACY | Facility: CLINIC | Age: 69
End: 2024-03-01

## 2024-03-08 ENCOUNTER — PHARMACY VISIT (OUTPATIENT)
Dept: PHARMACY | Facility: CLINIC | Age: 69
End: 2024-03-08
Payer: MEDICAID

## 2024-03-08 PROCEDURE — RXMED WILLOW AMBULATORY MEDICATION CHARGE

## 2024-04-17 DIAGNOSIS — I50.9 CHF (CONGESTIVE HEART FAILURE) (MULTI): Primary | ICD-10-CM

## 2024-04-17 RX ORDER — FUROSEMIDE 20 MG/1
20 TABLET ORAL DAILY
Qty: 90 TABLET | Refills: 3 | Status: SHIPPED | OUTPATIENT
Start: 2024-04-17 | End: 2025-04-17

## 2024-05-07 ENCOUNTER — OFFICE VISIT (OUTPATIENT)
Dept: CARDIOLOGY | Facility: CLINIC | Age: 69
End: 2024-05-07
Payer: MEDICAID

## 2024-05-07 VITALS
OXYGEN SATURATION: 98 % | DIASTOLIC BLOOD PRESSURE: 70 MMHG | SYSTOLIC BLOOD PRESSURE: 116 MMHG | WEIGHT: 245 LBS | HEIGHT: 62 IN | BODY MASS INDEX: 45.08 KG/M2 | HEART RATE: 47 BPM

## 2024-05-07 DIAGNOSIS — I50.9 CONGESTIVE HEART FAILURE, UNSPECIFIED HF CHRONICITY, UNSPECIFIED HEART FAILURE TYPE (MULTI): Primary | ICD-10-CM

## 2024-05-07 PROCEDURE — 1159F MED LIST DOCD IN RCRD: CPT | Performed by: NURSE PRACTITIONER

## 2024-05-07 PROCEDURE — 99214 OFFICE O/P EST MOD 30 MIN: CPT | Performed by: NURSE PRACTITIONER

## 2024-05-07 PROCEDURE — 1036F TOBACCO NON-USER: CPT | Performed by: NURSE PRACTITIONER

## 2024-05-07 PROCEDURE — 1160F RVW MEDS BY RX/DR IN RCRD: CPT | Performed by: NURSE PRACTITIONER

## 2024-05-07 PROCEDURE — 3078F DIAST BP <80 MM HG: CPT | Performed by: NURSE PRACTITIONER

## 2024-05-07 PROCEDURE — 1126F AMNT PAIN NOTED NONE PRSNT: CPT | Performed by: NURSE PRACTITIONER

## 2024-05-07 PROCEDURE — 3074F SYST BP LT 130 MM HG: CPT | Performed by: NURSE PRACTITIONER

## 2024-05-07 ASSESSMENT — ENCOUNTER SYMPTOMS
DEPRESSION: 0
LOSS OF SENSATION IN FEET: 0
OCCASIONAL FEELINGS OF UNSTEADINESS: 0

## 2024-05-07 ASSESSMENT — PAIN SCALES - GENERAL: PAINLEVEL: 0-NO PAIN

## 2024-05-07 NOTE — PROGRESS NOTES
"Chief Complaint:   Follow-up and Hypertension    History Of Present Illness:    .HPI     Last Recorded Vitals:  Blood pressure 116/70, pulse (!) 47, height 1.575 m (5' 2\"), weight 111 kg (245 lb), SpO2 98%.     Past Medical History:  History reviewed. No pertinent past medical history.     Past Surgical History:  Past Surgical History:   Procedure Laterality Date    BI US GUIDED BREAST LOCALIZATION AND BIOPSY RIGHT Right 4/12/2022    BI US GUIDED BREAST LOCALIZATION AND BIOPSY RIGHT 4/12/2022    BI US GUIDED BREAST LOCALIZATION AND BIOPSY RIGHT Right 2/9/2022    BI US GUIDED BREAST LOCALIZATION AND BIOPSY RIGHT 2/9/2022       Social History:  Social History     Socioeconomic History    Marital status: Single     Spouse name: None    Number of children: None    Years of education: None    Highest education level: None   Occupational History    None   Tobacco Use    Smoking status: Never    Smokeless tobacco: Never   Substance and Sexual Activity    Alcohol use: None    Drug use: None    Sexual activity: None   Other Topics Concern    None   Social History Narrative    None     Social Determinants of Health     Financial Resource Strain: Not on file   Food Insecurity: Not on file   Transportation Needs: Not on file   Physical Activity: Not on file   Stress: Not on file   Social Connections: Not on file   Intimate Partner Violence: Not on file   Housing Stability: Not on file       Family History:  Family History   Problem Relation Name Age of Onset    Cardiomyopathy Brother           Allergies:  Codeine, Hydralazine, Hydrochlorothiazide, and Statins-hmg-coa reductase inhibitors    Outpatient Medications:  Current Outpatient Medications   Medication Sig Dispense Refill    aspirin 81 mg EC tablet Take 1 tablet (81 mg) by mouth once daily.      carvedilol (Coreg) 25 mg tablet Take 1 tablet (25 mg) by mouth 2 times a day.      cholecalciferol (Vitamin D-3) 25 MCG (1000 UT) capsule Take 1 capsule (25 mcg) by mouth once " "daily.      clonazePAM (KlonoPIN) 1 mg tablet Take 1 tablet (1 mg) by mouth 3 times a day as needed.      empagliflozin (Jardiance) 10 mg Take 1 tablet (10 mg) by mouth once daily. 90 tablet 3    ezetimibe (Zetia) 10 mg tablet Take 1 tablet (10 mg) by mouth once daily.      furosemide (Lasix) 20 mg tablet Take 1 tablet (20 mg) by mouth once daily. 90 tablet 3    insulin NPH and regular human (HumuLIN 70/30 U-100 Insulin) 100 unit/mL (70-30) injection Inject under the skin.      liraglutide (Victoza 2-Sal) 0.6 mg/0.1 mL (18 mg/3 mL) injection Inject under the skin.      magnesium oxide (Mag-Ox) 400 mg tablet Take 1 tablet (400 mg) by mouth once daily.      sacubitriL-valsartan (Entresto)  mg tablet Take one (1) tablet by mouth 2 times a day. 180 tablet 3     No current facility-administered medications for this visit.        Physical Exam:  Physical Exam    ROS:  ROS       Last Labs:  CBC -  Lab Results   Component Value Date    WBC CANCELED 08/19/2023    HGB CANCELED 08/19/2023    HCT CANCELED 08/19/2023    MCV CANCELED 08/19/2023    PLT CANCELED 08/19/2023       CMP -  Lab Results   Component Value Date    CALCIUM 9.3 12/07/2023    PROT CANCELED 08/19/2023    ALBUMIN CANCELED 08/19/2023    AST CANCELED 08/19/2023    ALT CANCELED 08/19/2023    ALKPHOS CANCELED 08/19/2023    BILITOT CANCELED 08/19/2023       LIPID PANEL -   No results found for: \"CHOL\", \"TRIG\", \"HDL\", \"CHHDL\", \"LDLF\", \"VLDL\", \"NHDL\"    RENAL FUNCTION PANEL -   Lab Results   Component Value Date    GLUCOSE 105 (H) 12/07/2023     12/07/2023    K 4.4 12/07/2023     12/07/2023    CO2 30 12/07/2023    ANIONGAP 12 12/07/2023    BUN 19 12/07/2023    CREATININE 0.89 12/07/2023    GFRMALE CANCELED 08/19/2023    CALCIUM 9.3 12/07/2023    ALBUMIN CANCELED 08/19/2023        Lab Results   Component Value Date    HGBA1C 5.9 (H) 12/07/2023         Assessment/Plan   Problem List Items Addressed This Visit    None    1. NICM/CHF. Records from [facility] " show cardiomyopathy since 1996, with EF 35-45%. Echo done 01/24/2018 at [facility] showed EF 35%, LVH. ICD was followed at [facility]. Placed 04/17/2012 by Dr Ovidio Reyna. Has established with Dr Hernández. The patient did have a repeat surveillance echocardiogram on 05/21/2019 demonstrating an LV ejection fraction at 25â€“30 percent with moderate to severe left atrial enlargement and moderate to severe left ventricular chamber dilatation. Lab work from 08/12/2019 included a normal CBC and SMA panel except glucose 153. Renal parameters were normal creatinine of 0.77. Currently taking carvedilol 25 mg twice daily and entresto  twice daily as well as furosemide 40 mg daily and spironolactone 25 mg daily.      Echo 10/2023  ONCLUSIONS:   1. Left ventricular systolic function is severely decreased with a 20-25% estimated ejection fraction.   2. Abnormal septal motion consistent with RV pacemaker.   3. Spectral Doppler shows an impaired relaxation pattern of left ventricular diastolic filling.   4. Trace to mild mitral valve regurgitation.   5. Left ventricular cavity size is severely dilated.   6. There is global hypokinesis of the left ventricle with minor regional variations.     2. Status post ICD implantation for 17 2012 [facility] with pulse generator replacement 08/21/2019 [facility]. The patient has a single chamber device.     3. HTN. BP is adequately controlled today and will continue on the present dosages of carvedilol and the entresto plus spironolactone.     4. Hyperlipidemia. Patient had a direct LDL of 140 when checked on in 2021 at [facility]. Can't tolerate statins. On Zetia 10 mg daily.     5. DM. On insulin. Last A1C was 8.0% when checked in 2021 at [facility].      6. ELVA. The patient is having some trouble with her C Pap mask.     7. DJD. Follows with pain management for injections.      8. Hx of covid-19 vaccines.          Radha Ponce, APRN-CNP

## 2024-05-30 ENCOUNTER — SPECIALTY PHARMACY (OUTPATIENT)
Dept: PHARMACY | Facility: CLINIC | Age: 69
End: 2024-05-30

## 2024-05-30 PROCEDURE — RXMED WILLOW AMBULATORY MEDICATION CHARGE

## 2024-06-04 ENCOUNTER — PHARMACY VISIT (OUTPATIENT)
Dept: PHARMACY | Facility: CLINIC | Age: 69
End: 2024-06-04
Payer: MEDICAID

## 2024-06-19 ENCOUNTER — HOSPITAL ENCOUNTER (OUTPATIENT)
Dept: CARDIOLOGY | Facility: CLINIC | Age: 69
Discharge: HOME | End: 2024-06-19
Payer: MEDICAID

## 2024-06-19 DIAGNOSIS — I42.0 DILATED CARDIOMYOPATHY (MULTI): ICD-10-CM

## 2024-06-19 DIAGNOSIS — Z95.810 PRESENCE OF AUTOMATIC (IMPLANTABLE) CARDIAC DEFIBRILLATOR: ICD-10-CM

## 2024-08-28 ENCOUNTER — SPECIALTY PHARMACY (OUTPATIENT)
Dept: PHARMACY | Facility: CLINIC | Age: 69
End: 2024-08-28

## 2024-11-06 ENCOUNTER — APPOINTMENT (OUTPATIENT)
Dept: CARDIOLOGY | Facility: CLINIC | Age: 69
End: 2024-11-06
Payer: MEDICAID